# Patient Record
Sex: MALE | URBAN - METROPOLITAN AREA
[De-identification: names, ages, dates, MRNs, and addresses within clinical notes are randomized per-mention and may not be internally consistent; named-entity substitution may affect disease eponyms.]

---

## 2025-05-25 ENCOUNTER — OFFICE VISIT (OUTPATIENT)
Dept: URGENT CARE | Age: 45
End: 2025-05-25
Payer: OTHER GOVERNMENT

## 2025-05-25 ENCOUNTER — ANCILLARY PROCEDURE (OUTPATIENT)
Dept: URGENT CARE | Age: 45
End: 2025-05-25
Payer: OTHER GOVERNMENT

## 2025-05-25 VITALS
SYSTOLIC BLOOD PRESSURE: 112 MMHG | RESPIRATION RATE: 16 BRPM | HEIGHT: 70 IN | DIASTOLIC BLOOD PRESSURE: 72 MMHG | OXYGEN SATURATION: 97 % | HEART RATE: 81 BPM | TEMPERATURE: 98.3 F

## 2025-05-25 DIAGNOSIS — M25.572 ACUTE LEFT ANKLE PAIN: ICD-10-CM

## 2025-05-25 DIAGNOSIS — S82.892A AVULSION FRACTURE OF ANKLE, LEFT, CLOSED, INITIAL ENCOUNTER: Primary | ICD-10-CM

## 2025-05-25 DIAGNOSIS — T14.8XXA CHRONIC WOUND: ICD-10-CM

## 2025-05-25 DIAGNOSIS — L03.116 CELLULITIS OF LEFT LOWER EXTREMITY: ICD-10-CM

## 2025-05-25 PROCEDURE — 99204 OFFICE O/P NEW MOD 45 MIN: CPT | Performed by: PHYSICIAN ASSISTANT

## 2025-05-25 PROCEDURE — 1036F TOBACCO NON-USER: CPT | Performed by: PHYSICIAN ASSISTANT

## 2025-05-25 PROCEDURE — 73610 X-RAY EXAM OF ANKLE: CPT | Mod: LEFT SIDE | Performed by: PHYSICIAN ASSISTANT

## 2025-05-25 RX ORDER — CEPHALEXIN 500 MG/1
500 CAPSULE ORAL 3 TIMES DAILY
Qty: 30 CAPSULE | Refills: 0 | Status: SHIPPED | OUTPATIENT
Start: 2025-05-25 | End: 2025-06-01

## 2025-05-25 NOTE — LETTER
May 25, 2025     Patient: Macy Gonzales   YOB: 1980   Date of Visit: 5/25/2025       To Whom It May Concern:    Macy Gonzales was seen in my clinic on 5/25/2025 at 2:25 pm. Please excuse Macy for his absence from work on this day to make the appointment. Please excuse pt. From work today.     If you have any questions or concerns, please don't hesitate to call.         Sincerely,         Mindi Jimenez PA-C        CC: No Recipients

## 2025-05-25 NOTE — PATIENT INSTRUCTIONS
Keep area clean and dry   Wash area once daily with soap and water  Keep area elevated while at rest  Monitor for worsening signs of infection such as induration (looks like an orange peel), worsening redness, discharge, warmth, increasing in size, red streaks if these occur return to clinic or follow up with PCP. If you develop high fevers, chills proceed to the ER.      You have an old fracture of the left medial ankle. No need for splint now but you will need to follow up with orthopedics

## 2025-05-25 NOTE — PROGRESS NOTES
"Subjective   Patient ID: Macy Gonzales is a 44 y.o. male. They present today with a chief complaint of Foot Pain (Foot/ankle pain x for a while. Pt also has some slow healing sores around the area that are currently being treated.  Needs a work note).    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  Reports the pain is dull and achy and rated at 5 out of 10.  Reports will have cramps on and off at the medial ankle.  Reports sometimes the pain is throbbing with prolonged sitting. Reports had C. Diff with Augmentin.  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  Reports over the past couple years has  had multiple chronic ulcers at bilateral feet.  Reports  may have PAD but has not  been evaluated for PAD.  Denies fevers, chills, drainage from chronic ulcer, instability, numbness, tingling, buckling, injury.  Past Medical History  Allergies as of 05/25/2025 - Reviewed 05/25/2025   Allergen Reaction Noted    Escitalopram GI Upset and Other 04/15/2015    Tuberculin Rash 09/12/2016    Cyclobenzaprine hcl GI Upset 11/27/2017    Gabapentin GI Upset 11/27/2017       Prescriptions Prior to Admission[1]     Medical History[2]    Surgical History[3]     reports that he has never smoked. He has never used smokeless tobacco.    Review of Systems  Review of Systems       ROS negative with the exception as noted on HPI                         Objective    Vitals:    05/25/25 1514   BP: 112/72   Pulse: 81   Resp: 16   Temp: 36.8 °C (98.3 °F)   TempSrc: Oral   SpO2: 97%   Height: 1.778 m (5' 10\")     No LMP for male patient.    Physical Exam  Constitutional:       Appearance: Normal appearance.   HENT:      Head: Normocephalic and atraumatic.   Cardiovascular:      Rate and " Rhythm: Normal rate and regular rhythm.      Pulses: Normal pulses.      Heart sounds: Normal heart sounds.   Pulmonary:      Effort: Pulmonary effort is normal. No respiratory distress.      Breath sounds: Normal breath sounds. No stridor. No wheezing, rhonchi or rales.   Musculoskeletal:      Right knee: Normal.      Left knee: Normal.      Right lower leg: No swelling, lacerations, tenderness or bony tenderness.      Left lower leg: No swelling, lacerations, tenderness or bony tenderness.      Right ankle: No swelling or ecchymosis. No tenderness. Normal range of motion. Normal pulse.      Right Achilles Tendon: Normal.      Left ankle: Swelling (medially mild along with erythema surrounding small ulcer) present. No ecchymosis. Tenderness present over the medial malleolus (and dital medial leg). Normal range of motion. Normal pulse.      Left Achilles Tendon: Normal.      Right foot: Normal range of motion and normal capillary refill. No swelling, tenderness, bony tenderness or crepitus. Normal pulse.      Left foot: Normal range of motion and normal capillary refill. Swelling (and erythema linear streak from open sore at medial ankle extending to dorsal surface of left foot) present. No tenderness, bony tenderness or crepitus. Normal pulse.   Neurological:      Mental Status: He is alert.      Sensory: No sensory deficit.      Motor: No weakness.      Gait: Gait normal.      Deep Tendon Reflexes: Reflexes normal.         Procedures    Point of Care Test & Imaging Results from this visit  No results found for this visit on 05/25/25.   Imaging  XR ankle left 3+ views  Result Date: 5/25/2025  Remote appearing avulsion fracture at the medial malleolus. Bimalleolar soft tissue edema     MACRO: None   Signed by: Isaac Resendez 5/25/2025 4:14 PM Dictation workstation:   [id number]      Cardiology, Vascular, and Other Imaging  No other imaging results found for the past 2 days      Diagnostic study results (if any)  were reviewed by Mindi Jimenez PA-C.    Assessment/Plan   Allergies, medications, history, and pertinent labs/EKGs/Imaging reviewed by Mindi Jimenez PA-C.   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    On exam there is cellulitis of the foot originating from chronic wound.  Cephalexin started.   Referral to  vascular placed due to chronic wounds.   On x-ray there is old avulsion fracture of the medial malleolus.  Referral to orthopedics placed advised to follow-up with orthopedics.  Given fracture is old no need for splint currently.  Take antibiotics as directed to completion.  Pt. Is advised to clean area with plain soap and water, apply warm compresses. Monitor for  worsening signs of infection such as worsening surrounding erythema, edema, warmth, streaking, purulent drainage, fevers, chills. If these occur F/U with pcp. Risk, benefits, and potential side effects of medication(s) discussed with pt. Discussed disease/illness presentation, treatment options, progression, complications, and outcomes with patient. Pt. Has expressed understanding and is an agreement of plan of care.        Medical Decision Making      Orders and Diagnoses  Diagnoses and all orders for this visit:  Avulsion fracture of ankle, left, closed, initial encounter  -     Referral to Orthopedics and Sports Medicine; Future  Acute left ankle pain  -     XR ankle left 3+ views; Future  Chronic wound  -     Referral to Vascular Medicine; Future  Cellulitis of left lower extremity  -     cephalexin (Keflex) 500 mg capsule; Take 1 capsule (500 mg) by mouth 3 times a day for 7 days. Discard the rest      Medical Admin Record      Patient disposition: Home    Electronically signed by Mindi Jimenez PA-C  6:51 PM           [1] (Not in a hospital admission)   [2] No past medical history on file.  [3] No past  surgical history on file.